# Patient Record
Sex: MALE | Race: WHITE | NOT HISPANIC OR LATINO | URBAN - METROPOLITAN AREA
[De-identification: names, ages, dates, MRNs, and addresses within clinical notes are randomized per-mention and may not be internally consistent; named-entity substitution may affect disease eponyms.]

---

## 2020-02-03 PROBLEM — Z00.00 ENCOUNTER FOR PREVENTIVE HEALTH EXAMINATION: Status: ACTIVE | Noted: 2020-02-03

## 2020-02-04 ENCOUNTER — OUTPATIENT (OUTPATIENT)
Dept: OUTPATIENT SERVICES | Facility: HOSPITAL | Age: 65
LOS: 1 days | End: 2020-02-04
Payer: COMMERCIAL

## 2020-02-04 ENCOUNTER — APPOINTMENT (OUTPATIENT)
Dept: CT IMAGING | Facility: CLINIC | Age: 65
End: 2020-02-04
Payer: COMMERCIAL

## 2020-02-04 DIAGNOSIS — K43.9 VENTRAL HERNIA WITHOUT OBSTRUCTION OR GANGRENE: ICD-10-CM

## 2020-02-04 DIAGNOSIS — Z00.8 ENCOUNTER FOR OTHER GENERAL EXAMINATION: ICD-10-CM

## 2020-02-04 PROCEDURE — 74176 CT ABD & PELVIS W/O CONTRAST: CPT

## 2020-02-04 PROCEDURE — 74176 CT ABD & PELVIS W/O CONTRAST: CPT | Mod: 26

## 2020-03-12 ENCOUNTER — APPOINTMENT (OUTPATIENT)
Dept: ULTRASOUND IMAGING | Facility: CLINIC | Age: 65
End: 2020-03-12
Payer: COMMERCIAL

## 2020-03-12 ENCOUNTER — OUTPATIENT (OUTPATIENT)
Dept: OUTPATIENT SERVICES | Facility: HOSPITAL | Age: 65
LOS: 1 days | End: 2020-03-12
Payer: COMMERCIAL

## 2020-03-12 DIAGNOSIS — N28.1 CYST OF KIDNEY, ACQUIRED: ICD-10-CM

## 2020-03-12 DIAGNOSIS — Z00.8 ENCOUNTER FOR OTHER GENERAL EXAMINATION: ICD-10-CM

## 2020-03-12 PROCEDURE — 76775 US EXAM ABDO BACK WALL LIM: CPT | Mod: 26

## 2020-03-12 PROCEDURE — 76775 US EXAM ABDO BACK WALL LIM: CPT

## 2020-06-12 ENCOUNTER — OUTPATIENT (OUTPATIENT)
Dept: OUTPATIENT SERVICES | Facility: HOSPITAL | Age: 65
LOS: 1 days | End: 2020-06-12
Payer: COMMERCIAL

## 2020-06-12 DIAGNOSIS — Z98.890 OTHER SPECIFIED POSTPROCEDURAL STATES: Chronic | ICD-10-CM

## 2020-06-12 DIAGNOSIS — K40.90 UNILATERAL INGUINAL HERNIA, WITHOUT OBSTRUCTION OR GANGRENE, NOT SPECIFIED AS RECURRENT: ICD-10-CM

## 2020-06-12 DIAGNOSIS — Z01.818 ENCOUNTER FOR OTHER PREPROCEDURAL EXAMINATION: ICD-10-CM

## 2020-06-12 DIAGNOSIS — Z30.2 ENCOUNTER FOR STERILIZATION: Chronic | ICD-10-CM

## 2020-06-12 LAB
ANION GAP SERPL CALC-SCNC: 1 MMOL/L — LOW (ref 5–17)
BASOPHILS # BLD AUTO: 0.04 K/UL — SIGNIFICANT CHANGE UP (ref 0–0.2)
BASOPHILS NFR BLD AUTO: 0.5 % — SIGNIFICANT CHANGE UP (ref 0–2)
BUN SERPL-MCNC: 13 MG/DL — SIGNIFICANT CHANGE UP (ref 7–23)
CALCIUM SERPL-MCNC: 9 MG/DL — SIGNIFICANT CHANGE UP (ref 8.5–10.1)
CHLORIDE SERPL-SCNC: 105 MMOL/L — SIGNIFICANT CHANGE UP (ref 96–108)
CO2 SERPL-SCNC: 29 MMOL/L — SIGNIFICANT CHANGE UP (ref 22–31)
CREAT SERPL-MCNC: 1.03 MG/DL — SIGNIFICANT CHANGE UP (ref 0.5–1.3)
EOSINOPHIL # BLD AUTO: 0.28 K/UL — SIGNIFICANT CHANGE UP (ref 0–0.5)
EOSINOPHIL NFR BLD AUTO: 3.6 % — SIGNIFICANT CHANGE UP (ref 0–6)
GLUCOSE SERPL-MCNC: 92 MG/DL — SIGNIFICANT CHANGE UP (ref 70–99)
HCT VFR BLD CALC: 48.8 % — SIGNIFICANT CHANGE UP (ref 39–50)
HGB BLD-MCNC: 16.6 G/DL — SIGNIFICANT CHANGE UP (ref 13–17)
IMM GRANULOCYTES NFR BLD AUTO: 0.6 % — SIGNIFICANT CHANGE UP (ref 0–1.5)
LYMPHOCYTES # BLD AUTO: 1.76 K/UL — SIGNIFICANT CHANGE UP (ref 1–3.3)
LYMPHOCYTES # BLD AUTO: 22.3 % — SIGNIFICANT CHANGE UP (ref 13–44)
MCHC RBC-ENTMCNC: 31.3 PG — SIGNIFICANT CHANGE UP (ref 27–34)
MCHC RBC-ENTMCNC: 34 GM/DL — SIGNIFICANT CHANGE UP (ref 32–36)
MCV RBC AUTO: 92.1 FL — SIGNIFICANT CHANGE UP (ref 80–100)
MONOCYTES # BLD AUTO: 0.86 K/UL — SIGNIFICANT CHANGE UP (ref 0–0.9)
MONOCYTES NFR BLD AUTO: 10.9 % — SIGNIFICANT CHANGE UP (ref 2–14)
NEUTROPHILS # BLD AUTO: 4.89 K/UL — SIGNIFICANT CHANGE UP (ref 1.8–7.4)
NEUTROPHILS NFR BLD AUTO: 62.1 % — SIGNIFICANT CHANGE UP (ref 43–77)
PLATELET # BLD AUTO: 198 K/UL — SIGNIFICANT CHANGE UP (ref 150–400)
POTASSIUM SERPL-MCNC: 4.2 MMOL/L — SIGNIFICANT CHANGE UP (ref 3.5–5.3)
POTASSIUM SERPL-SCNC: 4.2 MMOL/L — SIGNIFICANT CHANGE UP (ref 3.5–5.3)
RBC # BLD: 5.3 M/UL — SIGNIFICANT CHANGE UP (ref 4.2–5.8)
RBC # FLD: 11.9 % — SIGNIFICANT CHANGE UP (ref 10.3–14.5)
SODIUM SERPL-SCNC: 135 MMOL/L — SIGNIFICANT CHANGE UP (ref 135–145)
WBC # BLD: 7.88 K/UL — SIGNIFICANT CHANGE UP (ref 3.8–10.5)
WBC # FLD AUTO: 7.88 K/UL — SIGNIFICANT CHANGE UP (ref 3.8–10.5)

## 2020-06-12 PROCEDURE — 85025 COMPLETE CBC W/AUTO DIFF WBC: CPT

## 2020-06-12 PROCEDURE — 36415 COLL VENOUS BLD VENIPUNCTURE: CPT

## 2020-06-12 PROCEDURE — 80048 BASIC METABOLIC PNL TOTAL CA: CPT

## 2020-06-12 RX ORDER — OMEGA-3 ACID ETHYL ESTERS 1 G
2 CAPSULE ORAL
Qty: 0 | Refills: 0 | DISCHARGE

## 2020-06-12 NOTE — ASU PATIENT PROFILE, ADULT - PMH
BPH (benign prostatic hyperplasia)    Groin pain, left    Hypertension    Inguinal hernia, left    Obesity    Sleep apnea  uses CPAP machine

## 2020-06-12 NOTE — CHART NOTE - NSCHARTNOTEFT_GEN_A_CORE
BP right arm sitting 134/88  HR 72 bpm  Jeanette 98.5 F  RR 14/min  O2 sat 98% on RA    63 yo male scheduled for open repair left inguinal hernia with mesh on 6/22/2020  with Dr. Gaytan    1. CBC  w diff, BMP  2. EKG on chart   3. Medical optimization with PCP Dr Jet Holloway  4. discussed EZ sponges & PST day of procedure instructions. NPO as per instructions from [facility]  5. Instructed to increase po fluids the day before surgery. Instructed to hold aspirin, NSAIDs, fish oil, vitamins & herbal supplements 7 days prior to surgery  6. COVID-19 PCR swab to be done in hospital on 6/19, pt aware.   consent signed & on chart. order entered in computer

## 2020-06-13 DIAGNOSIS — Z01.818 ENCOUNTER FOR OTHER PREPROCEDURAL EXAMINATION: ICD-10-CM

## 2020-06-13 DIAGNOSIS — K40.90 UNILATERAL INGUINAL HERNIA, WITHOUT OBSTRUCTION OR GANGRENE, NOT SPECIFIED AS RECURRENT: ICD-10-CM

## 2020-06-18 RX ORDER — MUPIROCIN 20 MG/G
1 OINTMENT TOPICAL
Qty: 0 | Refills: 0 | DISCHARGE
Start: 2020-06-18 | End: 2020-06-22

## 2020-06-19 ENCOUNTER — OUTPATIENT (OUTPATIENT)
Dept: OUTPATIENT SERVICES | Facility: HOSPITAL | Age: 65
LOS: 1 days | End: 2020-06-19
Payer: COMMERCIAL

## 2020-06-19 DIAGNOSIS — Z98.890 OTHER SPECIFIED POSTPROCEDURAL STATES: Chronic | ICD-10-CM

## 2020-06-19 DIAGNOSIS — Z30.2 ENCOUNTER FOR STERILIZATION: Chronic | ICD-10-CM

## 2020-06-19 DIAGNOSIS — Z11.59 ENCOUNTER FOR SCREENING FOR OTHER VIRAL DISEASES: ICD-10-CM

## 2020-06-19 PROCEDURE — U0003: CPT

## 2020-06-20 DIAGNOSIS — Z11.59 ENCOUNTER FOR SCREENING FOR OTHER VIRAL DISEASES: ICD-10-CM

## 2020-06-20 LAB — SARS-COV-2 RNA SPEC QL NAA+PROBE: SIGNIFICANT CHANGE UP

## 2020-06-22 ENCOUNTER — OUTPATIENT (OUTPATIENT)
Dept: INPATIENT UNIT | Facility: HOSPITAL | Age: 65
LOS: 1 days | Discharge: HOME | End: 2020-06-22
Payer: COMMERCIAL

## 2020-06-22 ENCOUNTER — RESULT REVIEW (OUTPATIENT)
Age: 65
End: 2020-06-22

## 2020-06-22 VITALS
RESPIRATION RATE: 16 BRPM | HEART RATE: 82 BPM | SYSTOLIC BLOOD PRESSURE: 146 MMHG | OXYGEN SATURATION: 98 % | DIASTOLIC BLOOD PRESSURE: 91 MMHG | TEMPERATURE: 98 F

## 2020-06-22 VITALS
DIASTOLIC BLOOD PRESSURE: 97 MMHG | SYSTOLIC BLOOD PRESSURE: 147 MMHG | OXYGEN SATURATION: 98 % | TEMPERATURE: 98 F | HEART RATE: 73 BPM | WEIGHT: 246.48 LBS | HEIGHT: 70 IN | RESPIRATION RATE: 15 BRPM

## 2020-06-22 DIAGNOSIS — Z98.890 OTHER SPECIFIED POSTPROCEDURAL STATES: Chronic | ICD-10-CM

## 2020-06-22 DIAGNOSIS — D17.6 BENIGN LIPOMATOUS NEOPLASM OF SPERMATIC CORD: ICD-10-CM

## 2020-06-22 DIAGNOSIS — K40.90 UNILATERAL INGUINAL HERNIA, WITHOUT OBSTRUCTION OR GANGRENE, NOT SPECIFIED AS RECURRENT: ICD-10-CM

## 2020-06-22 DIAGNOSIS — Z30.2 ENCOUNTER FOR STERILIZATION: Chronic | ICD-10-CM

## 2020-06-22 PROCEDURE — C1781: CPT

## 2020-06-22 PROCEDURE — 88304 TISSUE EXAM BY PATHOLOGIST: CPT | Mod: 26

## 2020-06-22 PROCEDURE — 88304 TISSUE EXAM BY PATHOLOGIST: CPT

## 2020-06-22 RX ORDER — TAMSULOSIN HYDROCHLORIDE 0.4 MG/1
1 CAPSULE ORAL
Qty: 0 | Refills: 0 | DISCHARGE

## 2020-06-22 RX ORDER — ONDANSETRON 8 MG/1
4 TABLET, FILM COATED ORAL ONCE
Refills: 0 | Status: DISCONTINUED | OUTPATIENT
Start: 2020-06-22 | End: 2020-06-22

## 2020-06-22 RX ORDER — OMEGA-3 ACID ETHYL ESTERS 1 G
1 CAPSULE ORAL
Qty: 0 | Refills: 0 | DISCHARGE

## 2020-06-22 RX ORDER — FENTANYL CITRATE 50 UG/ML
50 INJECTION INTRAVENOUS
Refills: 0 | Status: DISCONTINUED | OUTPATIENT
Start: 2020-06-22 | End: 2020-06-22

## 2020-06-22 RX ORDER — IBUPROFEN 200 MG
1 TABLET ORAL
Qty: 30 | Refills: 0
Start: 2020-06-22

## 2020-06-22 RX ORDER — OXYCODONE HYDROCHLORIDE 5 MG/1
5 TABLET ORAL ONCE
Refills: 0 | Status: DISCONTINUED | OUTPATIENT
Start: 2020-06-22 | End: 2020-06-22

## 2020-06-22 RX ORDER — OXYCODONE HYDROCHLORIDE 5 MG/1
10 TABLET ORAL ONCE
Refills: 0 | Status: DISCONTINUED | OUTPATIENT
Start: 2020-06-22 | End: 2020-06-22

## 2020-06-22 RX ORDER — CHOLECALCIFEROL (VITAMIN D3) 125 MCG
1 CAPSULE ORAL
Qty: 0 | Refills: 0 | DISCHARGE

## 2020-06-22 RX ORDER — FELODIPINE 5 MG/1
1 TABLET, FILM COATED, EXTENDED RELEASE ORAL
Qty: 0 | Refills: 0 | DISCHARGE

## 2020-06-22 RX ORDER — SODIUM CHLORIDE 9 MG/ML
1000 INJECTION, SOLUTION INTRAVENOUS
Refills: 0 | Status: DISCONTINUED | OUTPATIENT
Start: 2020-06-22 | End: 2020-06-22

## 2020-06-22 NOTE — ASU DISCHARGE PLAN (ADULT/PEDIATRIC) - CALL YOUR DOCTOR IF YOU HAVE ANY OF THE FOLLOWING:
Pain not relieved by Medications/Increased irritability or sluggishness/Unable to urinate/Fever greater than (need to indicate Fahrenheit or Celsius)/Nausea and vomiting that does not stop/Wound/Surgical Site with redness, or foul smelling discharge or pus/Bleeding that does not stop/Inability to tolerate liquids or foods/Numbness, tingling, color or temperature change to extremity/Swelling that gets worse/Excessive diarrhea

## 2020-06-22 NOTE — ASU DISCHARGE PLAN (ADULT/PEDIATRIC) - ASU DC SPECIAL INSTRUCTIONSFT
Please follow up in Dr. Pierre office in one week following surgery. No heavy lifting for 4-6 weeks following surgery. Ok to remove surgical dressing in two days and leave steri strips in place. OK to wash area with warm soapy water, steri strips will fall off on own. If any acute changes in medical condition report to emergency department.

## 2020-06-26 DIAGNOSIS — K40.90 UNILATERAL INGUINAL HERNIA, WITHOUT OBSTRUCTION OR GANGRENE, NOT SPECIFIED AS RECURRENT: ICD-10-CM

## 2020-06-26 DIAGNOSIS — G47.30 SLEEP APNEA, UNSPECIFIED: ICD-10-CM

## 2020-06-26 DIAGNOSIS — I10 ESSENTIAL (PRIMARY) HYPERTENSION: ICD-10-CM

## 2020-06-26 DIAGNOSIS — N40.0 BENIGN PROSTATIC HYPERPLASIA WITHOUT LOWER URINARY TRACT SYMPTOMS: ICD-10-CM

## 2020-06-26 DIAGNOSIS — E66.9 OBESITY, UNSPECIFIED: ICD-10-CM

## 2020-09-14 PROBLEM — R10.32 LEFT LOWER QUADRANT PAIN: Chronic | Status: ACTIVE | Noted: 2020-06-12

## 2020-09-14 PROBLEM — I10 ESSENTIAL (PRIMARY) HYPERTENSION: Chronic | Status: ACTIVE | Noted: 2020-06-12

## 2020-09-14 PROBLEM — N40.0 BENIGN PROSTATIC HYPERPLASIA WITHOUT LOWER URINARY TRACT SYMPTOMS: Chronic | Status: ACTIVE | Noted: 2020-06-12

## 2020-09-14 PROBLEM — G47.30 SLEEP APNEA, UNSPECIFIED: Chronic | Status: ACTIVE | Noted: 2020-06-12

## 2020-09-14 PROBLEM — K40.90 UNILATERAL INGUINAL HERNIA, WITHOUT OBSTRUCTION OR GANGRENE, NOT SPECIFIED AS RECURRENT: Chronic | Status: ACTIVE | Noted: 2020-06-12

## 2020-09-14 PROBLEM — E66.9 OBESITY, UNSPECIFIED: Chronic | Status: ACTIVE | Noted: 2020-06-12

## 2021-02-18 ENCOUNTER — APPOINTMENT (OUTPATIENT)
Dept: ULTRASOUND IMAGING | Facility: CLINIC | Age: 66
End: 2021-02-18

## 2021-03-03 ENCOUNTER — TRANSCRIPTION ENCOUNTER (OUTPATIENT)
Age: 66
End: 2021-03-03

## 2024-05-23 NOTE — ASU PATIENT PROFILE, ADULT - PATIENT REPRESENTATIVE NAME
Hoda Rebollar Labs are unremarkable.  headache resolved.  Congestion improved with pseudoephedrine.  Will discharge.  Prescription for pseudoephedrine.  Follow-up with primary care doctor as well as neurology.  Return precautions discussed.